# Patient Record
Sex: MALE | Race: WHITE | NOT HISPANIC OR LATINO | Employment: FULL TIME | URBAN - METROPOLITAN AREA
[De-identification: names, ages, dates, MRNs, and addresses within clinical notes are randomized per-mention and may not be internally consistent; named-entity substitution may affect disease eponyms.]

---

## 2023-12-06 DIAGNOSIS — I50.22 CHRONIC SYSTOLIC (CONGESTIVE) HEART FAILURE (MULTI): ICD-10-CM

## 2023-12-07 RX ORDER — CARVEDILOL 12.5 MG/1
12.5 TABLET ORAL 2 TIMES DAILY
Qty: 180 TABLET | Refills: 1 | Status: SHIPPED | OUTPATIENT
Start: 2023-12-07 | End: 2024-06-04

## 2024-01-19 PROBLEM — I42.8 NONISCHEMIC CARDIOMYOPATHY (MULTI): Status: ACTIVE | Noted: 2024-01-19

## 2024-01-19 PROBLEM — I50.22 CHRONIC SYSTOLIC HEART FAILURE (MULTI): Status: ACTIVE | Noted: 2024-01-19

## 2024-01-19 PROBLEM — I51.3 LV (LEFT VENTRICULAR) MURAL THROMBUS: Status: ACTIVE | Noted: 2024-01-19

## 2024-01-19 RX ORDER — WARFARIN SODIUM 5 MG/1
TABLET ORAL
COMMUNITY
Start: 2020-05-18 | End: 2024-02-13 | Stop reason: ALTCHOICE

## 2024-01-19 RX ORDER — MULTIVITAMIN
1 TABLET ORAL DAILY
COMMUNITY
Start: 2019-08-26

## 2024-01-19 RX ORDER — SPIRONOLACTONE 25 MG/1
25 TABLET ORAL DAILY
COMMUNITY
End: 2024-06-11 | Stop reason: SDUPTHER

## 2024-01-19 RX ORDER — SACUBITRIL AND VALSARTAN 97; 103 MG/1; MG/1
1 TABLET, FILM COATED ORAL 2 TIMES DAILY
COMMUNITY
End: 2024-06-05

## 2024-01-19 RX ORDER — BETAMETHASONE VALERATE 1 MG/G
CREAM TOPICAL
COMMUNITY
Start: 2023-09-22

## 2024-02-13 ENCOUNTER — OFFICE VISIT (OUTPATIENT)
Dept: CARDIOLOGY | Facility: CLINIC | Age: 54
End: 2024-02-13
Payer: COMMERCIAL

## 2024-02-13 VITALS
DIASTOLIC BLOOD PRESSURE: 94 MMHG | HEART RATE: 98 BPM | SYSTOLIC BLOOD PRESSURE: 144 MMHG | OXYGEN SATURATION: 99 % | HEIGHT: 71 IN | WEIGHT: 210 LBS | BODY MASS INDEX: 29.4 KG/M2

## 2024-02-13 DIAGNOSIS — I42.8 NONISCHEMIC CARDIOMYOPATHY (MULTI): ICD-10-CM

## 2024-02-13 DIAGNOSIS — I51.3 LV (LEFT VENTRICULAR) MURAL THROMBUS: ICD-10-CM

## 2024-02-13 DIAGNOSIS — I50.22 CHRONIC SYSTOLIC HEART FAILURE (MULTI): Primary | ICD-10-CM

## 2024-02-13 PROCEDURE — 1036F TOBACCO NON-USER: CPT | Performed by: INTERNAL MEDICINE

## 2024-02-13 PROCEDURE — 99213 OFFICE O/P EST LOW 20 MIN: CPT | Performed by: INTERNAL MEDICINE

## 2024-02-13 PROCEDURE — 93000 ELECTROCARDIOGRAM COMPLETE: CPT | Performed by: INTERNAL MEDICINE

## 2024-02-13 NOTE — PATIENT INSTRUCTIONS
It was a pleasure seeing you today. Please contact myself or my team with any questions.     To reach Dr. De Leon' office please call [phone] ([place]).   Fax: [phone]   To schedule an appointment call [phone]     If you have any questions or need cardiac medication refills, please call the Heart Failure office at [phone], option 6. You may also contact the  Heart Failure Nursing team via email at [email or web address] (Please include your name and date of birth).          1) Continue your current medications  2) Follow up in 1 year at /[facility]

## 2024-02-13 NOTE — PROGRESS NOTES
"    [facility]  Primary Care Physician: Ramu Francois MD  Referring Provider/Cardiologist: Janee     Date of Visit: 02/13/2024  4:00 PM EST  Location of visit: [address]     HPI:   Mr. Chi is a 53M with a PMHx sig for HTN and stage C systolic HF/NICM/HFmrEF (LVEF 15-20-->45-50%) and h/o laminated LV thrombus who returns to the Advanced Heart Failure clinic for ongoing evaluation and management.     Interval hx:   Currently denies chest pain, palpitations, shortness of breath, dyspnea on exertion, orthopnea, PND. No edema noted in BLE. Patient denies headaches, dizziness or recent falls.   Patient states he has dizziness at times but has new hearing aids.       No interim hospitalizations/ED visits  Adherent to low sodium diet and medications      SocHx:   Former smoker (rare cigar smoke), ETOH abuse (quit 8-11-19), Denies illicits  Lives in [place]    FamHx:   No CAD       Current Outpatient Medications   Medication Sig Dispense Refill    betamethasone valerate (Valisone) 0.1 % cream APPLY TO THE AFFECTED AREA(S) EXTERNALLY ONCE DAILY      carvedilol (Coreg) 12.5 mg tablet Take 1 tablet (12.5 mg) by mouth 2 times a day. 180 tablet 1    Entresto  mg tablet Take 1 tablet by mouth 2 times a day.      multivitamin tablet Take 1 tablet by mouth once daily.      spironolactone (Aldactone) 25 mg tablet Take 1 tablet (25 mg) by mouth once daily.       No current facility-administered medications for this visit.       No Known Allergies      Visit Vitals  BP (!) 144/94 (BP Location: Left arm, Patient Position: Sitting)   Pulse 98   Ht 1.803 m (5' 11\")   Wt 95.3 kg (210 lb)   SpO2 99%   BMI 29.29 kg/m²   Smoking Status Former   BSA 2.18 m²        Physical Exam:  On exam Mr. Chi appears his stated age, is alert and oriented x3, and in no acute distress. His sclera are anicteric and his oropharynx has moist mucous membranes. His neck is supple and without " thyromegaly. The JVP is ~5 cm of water above the right atrium. His cardiac exam has regular rhythm, normal S1, S2. No S3/4. There are no murmurs. His lungs are clear to auscultation bilaterally and there is no dullness to percussion. His abdomen is soft, nontender with normoactive bowel sounds. There is no HJR. The extremities are warm and without edema. The skin is dry. There is no rash present. The distal pulses are 2+ in all four extremities. His mood and affect are appropriate for todays encounter.      Cardiac Labs/Diagnostics:    Lab Results   Component Value Date    CREATININE 0.99 08/26/2019    BUN 16 08/26/2019     08/26/2019    K 4.4 08/26/2019     08/26/2019    CO2 29 08/26/2019        Recent Labs     08/12/19  1440   CHOL 187   LDLF 125*   HDL 32.7*   TRIG 145       Recent Labs     08/12/19  1440   BNP 1,212*     ECG (2/13/24):  Sinus rhythm (HR 94), no ischemia/infarct    Echo (6/30/23):  1. Left ventricular systolic function is mildly decreased with a 45-50% estimated ejection fraction.  2. Spectral Doppler shows an impaired relaxation pattern of left ventricular diastolic filling.  3. There is global hypokinesis of the left ventricle with minor regional variations.    ECG (2/14/23):  Sinus rhythm (HR 90), no ischemia/infarct    Echo (12/1/21):  1. The left ventricular systolic function is low normal with a 45-50% estimated ejection fraction.  2. Spectral Doppler shows an impaired relaxation pattern of left ventricular diastolic filling.  3. The left atrium is moderately dilated.  4. There is global hypokinesis of the left ventricle with minor regional variations.    ECG (6/21/21):  Sinus rhythm (HR 73), no ischemia/infarct    Echo (9/9/20):  1. The left ventricular systolic function is low normal with a 50-55% estimated ejection fraction.  2. A chronic, organized, panniculated thrombus is seen in the LV apex.  3. Spectral Doppler shows an impaired relaxation pattern of left ventricular  diastolic filling.  4. Definity contrast was administered iv to facilitate endocardial edge detection.    Echo (5/1/20):  1. The left ventricular systolic function is mildly decreased with a 45-50% estimated ejection fraction.  2. An echogenic structure is noted in the LV apex suggestive of organized LV apical thrombus.  3. Spectral Doppler shows an impaired relaxation pattern of left ventricular diastolic filling.  4. There is global hypokinesis of the left ventricle with minor regional variations.    Echo (1/10/20):  1. The left ventricular systolic function is mildly decreased with a 45-50% estimated ejection fraction.  2. Spectral Doppler shows an impaired relaxation pattern of left ventricular diastolic filling.  3. There is apical hypokinesis. In multiple views, there is increased echogenicity seen in the LV apex. This likely represents paniculated thrombus. Less likely would be LV trabeculations.  4. The estimated RVSP is 19 mm.    cMRI (9/12/19):  1. Severe biventricular heart failure.  2. LGE pattern consistent with NICMP.    Echo (8/15/19):  1. The left ventricular systolic function is severely decreased with a 15-20% estimated ejection fraction.  2. Spectral Doppler shows an abnormal pattern of left ventricular diastolic filling.  3. There is mild to moderate eccentric left ventricular hypertrophy.  4. The left atrium is severely dilated.  5. The left ventricular cavity size is severely dilated.  6. There is global hypokinesis of the left ventricle with minor regional variations.    Cardiac cath (8/13/19):  Severely reduced cardiac output.  Normal coronary anatomy.       Impression/Plan:  Mr. Chi is a 53M with a PMHx sig for HTN and stage C systolic HF/NICM/HFmrEF (LVEF 15-20-->45-50%) and h/o laminated LV thrombus who returns to the Advanced Heart Failure clinic for ongoing evaluation and management. At the current time he has functional class I symptoms and appears euvolemic on exam.     1) Stage C  chronic systolic HF/NICM/HFmrEF (LVEF 15-20-->45-50%; 6/2023) complicated by persistent LV thrombus (laminated), now off OAC  Tolerating GDMT. No hospitalizations.   -c/w carvedilol 12.5 mg bid, entresto 97/103 mg bid, spironolactone 25 mg daily      F/U: annually at /[facility]    ____________________________________________________________  Albert De Leon DO  Section of Advanced Heart Failure and Cardiac Transplantation  Division of Cardiovascular Medicine  [facility]  [facility]

## 2024-06-05 DIAGNOSIS — I50.22 CHRONIC SYSTOLIC (CONGESTIVE) HEART FAILURE (MULTI): ICD-10-CM

## 2024-06-05 RX ORDER — SACUBITRIL AND VALSARTAN 97; 103 MG/1; MG/1
1 TABLET, FILM COATED ORAL 2 TIMES DAILY
Qty: 180 TABLET | Refills: 3 | Status: SHIPPED | OUTPATIENT
Start: 2024-06-05

## 2024-06-11 DIAGNOSIS — I42.8 NONISCHEMIC CARDIOMYOPATHY (MULTI): Primary | ICD-10-CM

## 2024-06-11 DIAGNOSIS — I50.22 CHRONIC SYSTOLIC HEART FAILURE (MULTI): ICD-10-CM

## 2024-06-11 RX ORDER — SPIRONOLACTONE 25 MG/1
25 TABLET ORAL DAILY
Qty: 90 TABLET | Refills: 3 | Status: SHIPPED | OUTPATIENT
Start: 2024-06-11 | End: 2025-06-11

## 2024-06-20 DIAGNOSIS — I50.22 CHRONIC SYSTOLIC (CONGESTIVE) HEART FAILURE (MULTI): ICD-10-CM

## 2024-06-20 RX ORDER — CARVEDILOL 12.5 MG/1
12.5 TABLET ORAL 2 TIMES DAILY
Qty: 180 TABLET | Refills: 1 | Status: SHIPPED | OUTPATIENT
Start: 2024-06-20 | End: 2024-12-17

## 2025-02-04 DIAGNOSIS — I50.22 CHRONIC SYSTOLIC HEART FAILURE: Primary | ICD-10-CM

## 2025-02-11 ENCOUNTER — APPOINTMENT (OUTPATIENT)
Dept: CARDIOLOGY | Facility: CLINIC | Age: 55
End: 2025-02-11
Payer: COMMERCIAL

## 2025-02-19 DIAGNOSIS — I50.22 CHRONIC SYSTOLIC (CONGESTIVE) HEART FAILURE: ICD-10-CM

## 2025-02-19 RX ORDER — CARVEDILOL 12.5 MG/1
12.5 TABLET ORAL 2 TIMES DAILY
Qty: 180 TABLET | Refills: 1 | Status: SHIPPED | OUTPATIENT
Start: 2025-02-19